# Patient Record
Sex: MALE | Race: OTHER | Employment: UNEMPLOYED | URBAN - METROPOLITAN AREA
[De-identification: names, ages, dates, MRNs, and addresses within clinical notes are randomized per-mention and may not be internally consistent; named-entity substitution may affect disease eponyms.]

---

## 2024-10-19 ENCOUNTER — HOSPITAL ENCOUNTER (EMERGENCY)
Facility: HOSPITAL | Age: 39
Discharge: HOME | End: 2024-10-19
Attending: EMERGENCY MEDICINE

## 2024-10-19 VITALS
TEMPERATURE: 98.4 F | SYSTOLIC BLOOD PRESSURE: 169 MMHG | WEIGHT: 161.16 LBS | OXYGEN SATURATION: 100 % | HEART RATE: 92 BPM | DIASTOLIC BLOOD PRESSURE: 90 MMHG | RESPIRATION RATE: 16 BRPM

## 2024-10-19 DIAGNOSIS — R11.0 NAUSEA: ICD-10-CM

## 2024-10-19 DIAGNOSIS — F41.9 ANXIETY: Primary | ICD-10-CM

## 2024-10-19 PROCEDURE — 99282 EMERGENCY DEPT VISIT SF MDM: CPT

## 2024-10-19 PROCEDURE — 99284 EMERGENCY DEPT VISIT MOD MDM: CPT | Performed by: EMERGENCY MEDICINE

## 2024-10-19 RX ORDER — ONDANSETRON 4 MG/1
4 TABLET, ORALLY DISINTEGRATING ORAL EVERY 8 HOURS PRN
Qty: 10 TABLET | Refills: 0 | Status: SHIPPED | OUTPATIENT
Start: 2024-10-19 | End: 2024-10-24

## 2024-10-19 RX ORDER — MAGNESIUM HYDROXIDE/ALUMINUM HYDROXICE/SIMETHICONE 120; 1200; 1200 MG/30ML; MG/30ML; MG/30ML
30 SUSPENSION ORAL ONCE
Status: COMPLETED | OUTPATIENT
Start: 2024-10-19 | End: 2024-10-19

## 2024-10-19 RX ORDER — ONDANSETRON 4 MG/1
4 TABLET, ORALLY DISINTEGRATING ORAL ONCE
Status: COMPLETED | OUTPATIENT
Start: 2024-10-19 | End: 2024-10-19

## 2024-10-19 RX ADMIN — ONDANSETRON 4 MG: 4 TABLET, ORALLY DISINTEGRATING ORAL at 12:08

## 2024-10-19 RX ADMIN — ALUMINUM HYDROXIDE, MAGNESIUM HYDROXIDE, AND DIMETHICONE 30 ML: 200; 20; 200 SUSPENSION ORAL at 12:08

## 2024-10-19 NOTE — ED PROVIDER NOTES
"Time reflects when diagnosis was documented in both MDM as applicable and the Disposition within this note       Time User Action Codes Description Comment    10/19/2024 12:01 PM Lewis Burkettaz Add [F41.9] Anxiety     10/19/2024 12:01 PM Sandro Burkett Add [R11.0] Nausea           ED Disposition       ED Disposition   Discharge    Condition   Stable    Date/Time   Sat Oct 19, 2024 12:01 PM    Comment   Kahlil Mason discharge to home/self care.                   Assessment & Plan       Medical Decision Making  Patient is a 39-year-old male, history of diabetes, comes in with complaints of nausea, anxiety, patient says that he drank a power drink yesterday, woke up today with nauseous and feeling anxious, feels it is related to the drain, denies any other symptoms, no headache, chest pain, abdominal pain, vomiting, diarrhea.  On exam, patient is conscious, alert, well-appearing, no acute distress, sitting comfortably in the chair, abdomen is soft, nondistended, nontender.  Impression: Anxiety, GI viral illness, GERD, will give Zofran, Maalox, stable for discharge, Zofran prescription to pharmacy.    Problems Addressed:  Anxiety: acute illness or injury  Nausea: acute illness or injury    Risk  OTC drugs.  Prescription drug management.             Medications   ondansetron (ZOFRAN-ODT) dispersible tablet 4 mg (4 mg Oral Given 10/19/24 1208)   aluminum-magnesium hydroxide-simethicone (MAALOX) oral suspension 30 mL (30 mL Oral Given 10/19/24 1208)       ED Risk Strat Scores                                               History of Present Illness       Chief Complaint   Patient presents with    Nausea    Anxiety     Pt reports drank a [employer] energy drink yesterday and since then has been nauseous and jittery, reports \" I couldn't sleep last night\".       Past Medical History:   Diagnosis Date    Diabetes mellitus (HCC)       History reviewed. No pertinent surgical history.   History reviewed. No pertinent family history. "   Social History     Tobacco Use    Smoking status: Never    Smokeless tobacco: Never   Substance Use Topics    Alcohol use: Never     Comment: social    Drug use: Never      E-Cigarette/Vaping      E-Cigarette/Vaping Substances      I have reviewed and agree with the history as documented.       History provided by:  Patient   used: Yes ([employer] iPad # [id number])    Nausea  The primary symptoms include nausea. Primary symptoms do not include fever, abdominal pain, vomiting, diarrhea, dysuria or rash. The illness began today. The onset was gradual.   Nausea began today. The nausea is associated with eating. The nausea is exacerbated by food.   The illness does not include chills or back pain. Associated medical issues comments: DM. Risk factors: None.   Anxiety  Presenting symptoms: no agitation    Degree of incapacity (severity):  Mild  Onset quality:  Gradual  Duration:  1 day  Timing:  Intermittent  Progression:  Waxing and waning  Chronicity:  New  Context comment:  Patient had Energy drink yesteray, feels anxious, nauseas, no abd pain, diarrhea, no chest pain or dyspnea  Treatment compliance:  Untreated  Relieved by:  Nothing  Worsened by:  Nothing  Associated symptoms: no abdominal pain, no chest pain and no headaches    Risk factors comment:  DM      Review of Systems   Constitutional:  Negative for chills and fever.   HENT:  Negative for facial swelling, sore throat and trouble swallowing.    Eyes:  Negative for pain and visual disturbance.   Respiratory:  Negative for cough, chest tightness and shortness of breath.    Cardiovascular:  Negative for chest pain and leg swelling.   Gastrointestinal:  Positive for nausea. Negative for abdominal pain, diarrhea and vomiting.   Genitourinary:  Negative for dysuria and flank pain.   Musculoskeletal:  Negative for back pain, neck pain and neck stiffness.   Skin:  Negative for pallor and rash.   Allergic/Immunologic: Negative for environmental  allergies and immunocompromised state.   Neurological:  Negative for dizziness and headaches.   Hematological:  Negative for adenopathy. Does not bruise/bleed easily.   Psychiatric/Behavioral:  Negative for agitation and behavioral problems.    All other systems reviewed and are negative.          Objective       ED Triage Vitals [10/19/24 1123]   Temperature Pulse Blood Pressure Respirations SpO2 Patient Position - Orthostatic VS   98.4 °F (36.9 °C) 92 169/90 16 100 % --      Temp Source Heart Rate Source BP Location FiO2 (%) Pain Score    Oral Monitor -- -- --      Vitals      Date and Time Temp Pulse SpO2 Resp BP Pain Score FACES Pain Rating User   10/19/24 1123 98.4 °F (36.9 °C) 92 100 % 16 169/90 -- -- DIC            Physical Exam  Vitals and nursing note reviewed.   Constitutional:       General: He is not in acute distress.     Appearance: He is well-developed.   HENT:      Head: Normocephalic and atraumatic.   Eyes:      Extraocular Movements: Extraocular movements intact.   Cardiovascular:      Rate and Rhythm: Normal rate and regular rhythm.   Pulmonary:      Effort: Pulmonary effort is normal. No respiratory distress.   Abdominal:      Palpations: Abdomen is soft.      Tenderness: There is no abdominal tenderness. There is no guarding or rebound.   Musculoskeletal:         General: Normal range of motion.      Cervical back: Normal range of motion and neck supple.   Skin:     General: Skin is warm and dry.   Neurological:      General: No focal deficit present.      Mental Status: He is alert and oriented to person, place, and time.   Psychiatric:         Mood and Affect: Mood normal.         Behavior: Behavior normal.         Results Reviewed       None            No orders to display       Procedures    ED Medication and Procedure Management   None     Discharge Medication List as of 10/19/2024 12:02 PM        START taking these medications    Details   ondansetron (ZOFRAN-ODT) 4 mg disintegrating  tablet Take 1 tablet (4 mg total) by mouth every 8 (eight) hours as needed for nausea for up to 5 days, Starting Sat 10/19/2024, Until Thu 10/24/2024 at 2359, Normal           No discharge procedures on file.  ED SEPSIS DOCUMENTATION   Time reflects when diagnosis was documented in both MDM as applicable and the Disposition within this note       Time User Action Codes Description Comment    10/19/2024 12:01 PM Sandro Burkett Add [F41.9] Anxiety     10/19/2024 12:01 PM Sandro Burkett Add [R11.0] Nausea                  Sandro Burkett MD  10/19/24 1642